# Patient Record
Sex: FEMALE | Race: WHITE
[De-identification: names, ages, dates, MRNs, and addresses within clinical notes are randomized per-mention and may not be internally consistent; named-entity substitution may affect disease eponyms.]

---

## 2019-10-04 ENCOUNTER — HOSPITAL ENCOUNTER (EMERGENCY)
Dept: HOSPITAL 11 - JP.ED | Age: 29
Discharge: HOME | End: 2019-10-04
Payer: SELF-PAY

## 2019-10-04 DIAGNOSIS — Z79.899: ICD-10-CM

## 2019-10-04 DIAGNOSIS — F41.9: ICD-10-CM

## 2019-10-04 DIAGNOSIS — Z87.891: ICD-10-CM

## 2019-10-04 DIAGNOSIS — R04.2: Primary | ICD-10-CM

## 2019-10-04 PROCEDURE — 85610 PROTHROMBIN TIME: CPT

## 2019-10-04 PROCEDURE — 96360 HYDRATION IV INFUSION INIT: CPT

## 2019-10-04 PROCEDURE — 85379 FIBRIN DEGRADATION QUANT: CPT

## 2019-10-04 PROCEDURE — 71046 X-RAY EXAM CHEST 2 VIEWS: CPT

## 2019-10-04 PROCEDURE — 99284 EMERGENCY DEPT VISIT MOD MDM: CPT

## 2019-10-04 PROCEDURE — 36415 COLL VENOUS BLD VENIPUNCTURE: CPT

## 2019-10-04 PROCEDURE — 85025 COMPLETE CBC W/AUTO DIFF WBC: CPT

## 2019-10-04 PROCEDURE — 71260 CT THORAX DX C+: CPT

## 2019-10-04 PROCEDURE — 80053 COMPREHEN METABOLIC PANEL: CPT

## 2019-10-04 NOTE — CRLCT
HISTORY:



Hemoptysis.



TECHNIQUE:



Intravenous contrast enhanced CT of the chest. 100 mL of Isovue-300 

intravenous contrast administered.



COMPARISON:



Chest radiographs 10/04/2019.



FINDINGS:



There is no acute pulmonary embolism. No thoracic aortic aneurysm or 

dissection. No significant pericardial effusion. No enlarged mediastinal or 

hilar lymph nodes. No axillary adenopathy.



-



There is no lung infiltrate or pulmonary edema. No pleural effusion or 

pneumothorax. No endotracheal or proximal endobronchial mass.



-



No acute fractures.



-



2.3 cm lesion within the lateral segment left hepatic lobe demonstrates 

some peripheral discontinuous nodular enhancement and therefore likely 

reflects a hemangioma.



IMPRESSION:



1. No lung infiltrate or pulmonary edema.



2. No pulmonary embolism.



3. 2.3 cm left hepatic lobe lesion is likely a hemangioma.



Dictated by Cy Raymundo MD @ 10/4/2019 12:51:05 PM



Please note that all CT scans at this facility use dose modulation, 

iterative reconstruction, and/or weight-based dosing when appropriate to 

reduce radiation dose to as low as reasonably achievable.



Dictated by: Cy Raymundo MD @ 10/04/2019 12:51:12



(Electronically Signed)

## 2019-10-04 NOTE — EDM.PDOC
ED HPI GENERAL MEDICAL PROBLEM





- General


Chief Complaint: Respiratory Problem


Stated Complaint: BAD COUGH


Time Seen by Provider: 10/04/19 10:23


Source of Information: Reports: Patient, RN Notes Reviewed


History Limitations: Reports: No Limitations





- History of Present Illness


INITIAL COMMENTS - FREE TEXT/NARRATIVE: 





29-year-old female presents emergency department today with complaint of cough, 

she states she has coughed up blood yesterday she states she was coughing delta 

blood today it's more blood tinged sputum. She denies any fevers weight loss 

nausea vomiting does have a remote tobacco history approximate 10 years ago 

does have secondhand exposure


  ** Chest


Pain Score (Numeric/FACES): 7





- Related Data


 Allergies











Allergy/AdvReac Type Severity Reaction Status Date / Time


 


No Known Allergies Allergy   Verified 10/04/19 10:09











Home Meds: 


 Home Meds





Sertraline HCl 75 mg PO DAILY 10/04/19 [History]











Past Medical History


Respiratory History: Reports: Asthma


OB/GYN History: Reports: Pregnancy


Neurological History: Reports: Migraines


Psychiatric History: Reports: Anxiety, Dementia, Suicidal Ideation





- Past Surgical History


Female  Surgical History: Reports: Tubal Ligation





Social & Family History





- Tobacco Use


Smoking Status *Q: Former Smoker


Used Tobacco, but Quit: Yes


Month/Year Tobacco Last Used: 10 years





- Caffeine Use


Caffeine Use: Reports: Coffee





- Recreational Drug Use


Recreational Drug Use: No





ED ROS GENERAL





- Review of Systems


Review Of Systems: See Below


Constitutional: Reports: No Symptoms


HEENT: Reports: No Symptoms


Respiratory: Reports: Cough, Hemoptysis


Cardiovascular: Reports: No Symptoms


GI/Abdominal: Reports: No Symptoms


: Reports: No Symptoms





ED EXAM, GENERAL





- Physical Exam


Exam: See Below


Exam Limited By: No Limitations


General Appearance: Alert, WD/WN, No Apparent Distress


Eye Exam: Bilateral Eye: Normal Inspection


Ears: Normal External Exam, Normal Canal, Hearing Grossly Normal, Normal TMs


Nose: Normal Inspection, Normal Mucosa, No Blood


Throat/Mouth: Normal Inspection, Normal Lips, Normal Teeth, Normal Gums, Normal 

Oropharynx, Normal Voice, No Airway Compromise


Head: Atraumatic, Normocephalic


Neck: Normal Inspection, Supple, Non-Tender, Full Range of Motion


Respiratory/Chest: No Respiratory Distress, Lungs Clear, Normal Breath Sounds, 

No Accessory Muscle Use, Chest Non-Tender


Cardiovascular: Regular Rate, Rhythm, No Murmur





Course





- Vital Signs


Last Recorded V/S: 


 Last Vital Signs











Temp  97.5 F   10/04/19 10:05


 


Pulse  63   10/04/19 10:05


 


Resp  16   10/04/19 10:05


 


BP  111/42 L  10/04/19 10:05


 


Pulse Ox  99   10/04/19 10:05














- Orders/Labs/Meds


Orders: 


 Active Orders 24 hr











 Category Date Time Status


 


 Peripheral IV Care [RC] .AS DIRECTED Care  10/04/19 11:50 Active


 


 CULTURE RESPIRATORY + SMEAR [RM] Urgent Lab  10/04/19 10:31 Ordered


 


 Iopamidol [Isovue-300 (61%)] Med  10/04/19 12:15 Active





 100 ml IV .AS DIRECTED PRN   


 


 Sodium Chloride 0.9% [Normal Saline] 1,000 ml Med  10/04/19 12:00 Active





 IV ASDIRECTED   


 


 Sodium Chloride 0.9% [Normal Saline] 70 ml Med  10/04/19 12:15 Active





 IV ASDIRECTED   


 


 Sodium Chloride 0.9% [Saline Flush] Med  10/04/19 11:50 Active





 10 ml FLUSH ASDIRECTED PRN   


 


 Peripheral IV Insertion Adult [OM.PC] Urgent Oth  10/04/19 11:50 Ordered








 Medication Orders





Sodium Chloride (Normal Saline)  1,000 mls @ 500 mls/hr IV ASDIRECTED [facility]


   Last Admin: 10/04/19 12:46  Dose: 500 mls/hr


Sodium Chloride (Normal Saline)  70 mls @ 3 mls/sec IV ASDIRECTED [facility]


   Last Admin: 10/04/19 12:21  Dose: 3 mls/sec


Iopamidol (Isovue-300 (61%))  100 ml IV .AS DIRECTED PRN


   PRN Reason: RADIOLOGY EXAM


   Stop: 10/05/19 12:16


   Last Admin: 10/04/19 12:21  Dose: 100 ml


Sodium Chloride (Saline Flush)  10 ml FLUSH ASDIRECTED PRN


   PRN Reason: Keep Vein Open


   Last Admin: 10/04/19 12:10  Dose: 10 ml








Labs: 


 Laboratory Tests











  10/04/19 10/04/19 10/04/19 Range/Units





  10:41 10:41 10:41 


 


WBC  5.5    (4.5-11.0)  K/uL


 


RBC  4.56    (3.30-5.50)  M/uL


 


Hgb  12.4    (12.0-15.0)  g/dL


 


Hct  39.6    (36.0-48.0)  %


 


MCV  87    (80-98)  fL


 


MCH  27    (27-31)  pg


 


MCHC  31 L    (32-36)  %


 


Plt Count  319    (150-400)  K/uL


 


Neut % (Auto)  55    (36-66)  %


 


Lymph % (Auto)  31    (24-44)  %


 


Mono % (Auto)  12 H    (2-6)  %


 


Eos % (Auto)  2    (2-4)  %


 


Baso % (Auto)  1    (0-1)  %


 


PT   10.9   (9.5-12.0)  sec


 


INR   1.01   (0.80-1.20)  


 


D-Dimer, Quantitative    107  (0.0-400.0)  ng/mL


 


Sodium     (140-148)  mmol/L


 


Potassium     (3.6-5.2)  mmol/L


 


Chloride     (100-108)  mmol/L


 


Carbon Dioxide     (21-32)  mmol/L


 


Anion Gap     (5.0-14.0)  mmol/L


 


BUN     (7-18)  mg/dL


 


Creatinine     (0.6-1.0)  mg/dL


 


Est Cr Clr Drug Dosing     mL/min


 


Estimated GFR (MDRD)     (>60)  


 


Glucose     ()  mg/dL


 


Calcium     (8.5-10.1)  mg/dL


 


Total Bilirubin     (0.2-1.0)  mg/dL


 


AST     (15-37)  U/L


 


ALT     (12-78)  U/L


 


Alkaline Phosphatase     ()  U/L


 


Total Protein     (6.4-8.2)  g/dL


 


Albumin     (3.4-5.0)  g/dL


 


Globulin     (2.3-3.5)  g/dL


 


Albumin/Globulin Ratio     (1.2-2.2)  














  10/04/19 Range/Units





  10:41 


 


WBC   (4.5-11.0)  K/uL


 


RBC   (3.30-5.50)  M/uL


 


Hgb   (12.0-15.0)  g/dL


 


Hct   (36.0-48.0)  %


 


MCV   (80-98)  fL


 


MCH   (27-31)  pg


 


MCHC   (32-36)  %


 


Plt Count   (150-400)  K/uL


 


Neut % (Auto)   (36-66)  %


 


Lymph % (Auto)   (24-44)  %


 


Mono % (Auto)   (2-6)  %


 


Eos % (Auto)   (2-4)  %


 


Baso % (Auto)   (0-1)  %


 


PT   (9.5-12.0)  sec


 


INR   (0.80-1.20)  


 


D-Dimer, Quantitative   (0.0-400.0)  ng/mL


 


Sodium  141  (140-148)  mmol/L


 


Potassium  4.1  (3.6-5.2)  mmol/L


 


Chloride  105  (100-108)  mmol/L


 


Carbon Dioxide  25  (21-32)  mmol/L


 


Anion Gap  10.9  (5.0-14.0)  mmol/L


 


BUN  12  (7-18)  mg/dL


 


Creatinine  0.9  (0.6-1.0)  mg/dL


 


Est Cr Clr Drug Dosing  76.30  mL/min


 


Estimated GFR (MDRD)  > 60  (>60)  


 


Glucose  90  ()  mg/dL


 


Calcium  9.0  (8.5-10.1)  mg/dL


 


Total Bilirubin  0.3  (0.2-1.0)  mg/dL


 


AST  13 L  (15-37)  U/L


 


ALT  15  (12-78)  U/L


 


Alkaline Phosphatase  39 L  ()  U/L


 


Total Protein  7.5  (6.4-8.2)  g/dL


 


Albumin  3.7  (3.4-5.0)  g/dL


 


Globulin  3.8 H  (2.3-3.5)  g/dL


 


Albumin/Globulin Ratio  1.0 L  (1.2-2.2)  











Meds: 


Medications











Generic Name Dose Route Start Last Admin





  Trade Name Freq  PRN Reason Stop Dose Admin


 


Sodium Chloride  1,000 mls @ 500 mls/hr  10/04/19 12:00  10/04/19 12:46





  Normal Saline  IV   500 mls/hr





  ASDIRECTED DENIS   Administration





     





     





     





     


 


Sodium Chloride  70 mls @ 3 mls/sec  10/04/19 12:15  10/04/19 12:21





  Normal Saline  IV   3 mls/sec





  ASDIRECTED DENIS   Administration





     





     





     





     


 


Iopamidol  100 ml  10/04/19 12:15  10/04/19 12:21





  Isovue-300 (61%)  IV  10/05/19 12:16  100 ml





  .AS DIRECTED PRN   Administration





  RADIOLOGY EXAM   





     





     





     


 


Sodium Chloride  10 ml  10/04/19 11:50  10/04/19 12:10





  Saline Flush  FLUSH   10 ml





  ASDIRECTED PRN   Administration





  Keep Vein Open   





     





     





     














Departure





- Departure


Time of Disposition: 13:13


Disposition: Home, Self-Care 01


Condition: Fair


Clinical Impression: 


 Hemoptysis








- Discharge Information


Referrals: 


PCP,None [Primary Care Provider] - 


Forms:  ED Department Discharge


Additional Instructions: 


Please report to the outpatient surgical care unit Monday morning for 

bronchoscopy with Dr. Marshall





- My Orders


Last 24 Hours: 


My Active Orders





10/04/19 10:31


CULTURE RESPIRATORY + SMEAR [RM] Urgent 





10/04/19 11:50


Peripheral IV Care [RC] .AS DIRECTED 


Sodium Chloride 0.9% [Saline Flush]   10 ml FLUSH ASDIRECTED PRN 


Peripheral IV Insertion Adult [OM.PC] Urgent 





10/04/19 12:00


Sodium Chloride 0.9% [Normal Saline] 1,000 ml IV ASDIRECTED 





10/04/19 12:15


Iopamidol [Isovue-300 (61%)]   100 ml IV .AS DIRECTED PRN 


Sodium Chloride 0.9% [Normal Saline] 70 ml IV ASDIRECTED 














- Assessment/Plan


Last 24 Hours: 


My Active Orders





10/04/19 10:31


CULTURE RESPIRATORY + SMEAR [RM] Urgent 





10/04/19 11:50


Peripheral IV Care [RC] .AS DIRECTED 


Sodium Chloride 0.9% [Saline Flush]   10 ml FLUSH ASDIRECTED PRN 


Peripheral IV Insertion Adult [OM.PC] Urgent 





10/04/19 12:00


Sodium Chloride 0.9% [Normal Saline] 1,000 ml IV ASDIRECTED 





10/04/19 12:15


Iopamidol [Isovue-300 (61%)]   100 ml IV .AS DIRECTED PRN 


Sodium Chloride 0.9% [Normal Saline] 70 ml IV ASDIRECTED 











Plan: 





Assessment





Acuity = acute





Site and laterality = hemoptysis  





Etiology  = unknown etiology  





Manifestations = none  





Location of injury =  Home





Lab values = CBC, CMP, d-dimer all within normal limits, chest x-ray 

unremarkable CT scan with contrast shows no acute process  





Plan


Called discussed case with Dr. Marshall general surgery at 1300 he agreed to 

evaluate the patient with bronchoscopy this be planned for Monday morning  

















 This note was dictated using dragon voice recognition software please call 

with any questions on syntax or grammar.

## 2019-10-04 NOTE — CRLCR
INDICATION:



Hemoptysis.



TECHNIQUE:



PA and lateral.



COMPARISON:



None.



FINDINGS:



Lungs and pleural spaces clear. Heart size and pulmonary vasculature within 

normal limits. No significant osseous abnormality.



IMPRESSION:



Negative chest.



Dictated by Buster Almonte MD @ Oct  4 2019 11:07AM



Signed by Dr. Buster Almonte @ Oct  4 2019 11:08AM

## 2019-10-07 ENCOUNTER — HOSPITAL ENCOUNTER (OUTPATIENT)
Dept: HOSPITAL 11 - JP.SDS | Age: 29
Discharge: HOME | End: 2019-10-07
Attending: SURGERY
Payer: SELF-PAY

## 2019-10-07 DIAGNOSIS — J40: ICD-10-CM

## 2019-10-07 DIAGNOSIS — R04.2: Primary | ICD-10-CM

## 2019-10-07 PROCEDURE — 87070 CULTURE OTHR SPECIMN AEROBIC: CPT

## 2019-10-07 PROCEDURE — 31624 DX BRONCHOSCOPE/LAVAGE: CPT

## 2019-10-07 PROCEDURE — 87205 SMEAR GRAM STAIN: CPT

## 2019-10-07 PROCEDURE — 87116 MYCOBACTERIA CULTURE: CPT

## 2019-10-07 PROCEDURE — 87102 FUNGUS ISOLATION CULTURE: CPT

## 2019-10-07 PROCEDURE — 87220 TISSUE EXAM FOR FUNGI: CPT

## 2019-10-07 PROCEDURE — 87184 SC STD DISK METHOD PER PLATE: CPT

## 2019-10-07 PROCEDURE — 87015 SPECIMEN INFECT AGNT CONCNTJ: CPT

## 2019-10-07 PROCEDURE — 88112 CYTOPATH CELL ENHANCE TECH: CPT

## 2019-10-07 PROCEDURE — 87206 SMEAR FLUORESCENT/ACID STAI: CPT

## 2019-10-08 NOTE — OR
DATE OF PROCEDURE:  10/07/2019

 

SURGEON:  Arnaldo Marshall MD

 

PREOPERATIVE DIAGNOSIS:  History of hemoptysis.

 

POSTOPERATIVE DIAGNOSIS:  History of hemoptysis with mild bronchitis involving the right

upper lobe.

 

OPERATIVE PROCEDURE:  Flexible bronchoscopy with:

1. Tracheobronchial washings.

2. Bronchoalveolar lavage, right upper lobe.

 

ANESTHESIA:  Topical plus IV sedation.

 

INDICATION FOR PROCEDURE:  The patient was seen in the emergency room this past Friday with

some hemoptysis after having some heavy coughing.  However, over the weekend, she has

coughed up some more brown-type material consistent with some old blood, but no fresh blood,

and the cough itself has largely dissipated.  The patient did have a CT scan, which showed

no obvious pulmonary or tracheal pathology.  She did have what appeared to be hemangioma of

the liver, but that was felt to be a hemangioma that would not necessarily need any

additional followup or treatment.  Plan is to proceed with flexible bronchoscopy with

biopsies and/or lavage as indicated.  Potential risks including bleeding and infection were

reviewed, and the patient wishes to proceed.

 

DETAILS OF PROCEDURE:  The patient was taken to the operating room and placed in a semi-

sitting position.  IV sedation was administered, after which the transpharyngeal injection

of lidocaine was placed for anesthesia.  Flexible bronchoscope was then passed through the

left side of the nose.  The patient did have a little bit of nasal bleeding at that passage,

but this stopped spontaneously.

 

The hypopharynx and larynx were unremarkable.  Cord motion was symmetrical.  As one passed

into the trachea, there was no visible pathology.  Tracheal and carinal course were

unremarkable.  The tracheobronchial tree was diffusely evaluated.  There were no abnormal

secretions present, nothing more than just simple typical watery scant secretions.  There

was some very slight redness in the takeoff of the right upper lobe bronchus, but the

remainder of the tracheobronchial tree was unremarkable.

 

To this level, tracheobronchial washings were obtained.  Following this, the bronchoscope

was wedged into the right upper lobe bronchus and 200 mL of saline injected.  This affluent

was then removed in 2 containers, both of which along with tracheobronchial washings were

sent for full microbiologic and cytologic workup.  The procedure was then concluded.  The

microbiologic workup would include routine bacterial cultures, fungal stains and cultures,

and acid-fast stains and cultures, and the fluid also being sent for cytologic examination.

The patient was taken to the recovery room in satisfactory condition.

 

The patient will be following up with her personal provider, which is Janice Ross PA-C,

in [facility], and followup here will be on a p.r.n. basis.

 

 

 

 

Arnaldo Marshall MD

DD:  10/07/2019 16:45:48

DT:  10/07/2019 19:42:20

Job #:  [id number]

## 2019-10-10 ENCOUNTER — HOSPITAL ENCOUNTER (EMERGENCY)
Dept: HOSPITAL 11 - JP.ED | Age: 29
Discharge: HOME | End: 2019-10-10
Payer: SELF-PAY

## 2019-10-10 DIAGNOSIS — Z79.899: ICD-10-CM

## 2019-10-10 DIAGNOSIS — Z87.891: ICD-10-CM

## 2019-10-10 DIAGNOSIS — F41.9: ICD-10-CM

## 2019-10-10 DIAGNOSIS — F03.90: ICD-10-CM

## 2019-10-10 DIAGNOSIS — J13: Primary | ICD-10-CM

## 2019-10-10 DIAGNOSIS — R04.2: ICD-10-CM

## 2019-10-10 NOTE — CRLCR
INDICATION: coughing up blood



TECHNIQUE:



Chest 1 view. 



COMPARISON:



10/4/19



FINDINGS:



Cardiovascular and mediastinum: Heart size and vasculature are normal in 

caliber and appearance.  Mediastinum is within normal limits.  



Lungs and pleural space: Lungs are clear.  No sign of infiltrate or mass.  

No sign of pleural effusion.  No pneumothorax.  



Bones and soft tissues: No significant findings.  



IMPRESSION:



Unremarkable chest.



Dictated by: Nish Schaffer MD @ 10/10/2019 15:01:19



(Electronically Signed)

## 2019-10-10 NOTE — EDM.PDOC
ED HPI GENERAL MEDICAL PROBLEM





- General


Chief Complaint: Respiratory Problem


Stated Complaint: COUGH,DIZZY,PAIN


Time Seen by Provider: 10/10/19 13:40


Source of Information: Reports: Patient


History Limitations: Reports: No Limitations





- History of Present Illness


INITIAL COMMENTS - FREE TEXT/NARRATIVE: 





pt arrived with a history of coughing up alot of blood. She had a bronchoscopy 

done on Monday. Since that time she has had pain in her chest and back. She 

does continue to cough up blood.  She states the  sputum that she raises  is 

very bloody. 


Onset: Today


Duration: Hour(s):


Location: Reports: Chest, Back


Associated Symptoms: Reports: Chest Pain, Other (pain in her back. She 

continues to cough up blood. )


  ** Chest


Pain Score (Numeric/FACES): 10





- Related Data


 Allergies











Allergy/AdvReac Type Severity Reaction Status Date / Time


 


No Known Allergies Allergy   Verified 10/10/19 14:54











Home Meds: 


 Home Meds





Sertraline HCl 75 mg PO DAILY 10/04/19 [History]











Past Medical History


HEENT History: Reports: Impaired Vision


Respiratory History: Reports: Asthma


Gastrointestinal History: Reports: GERD


Genitourinary History: Reports: UTI, Recurrent


OB/GYN History: Reports: Pregnancy, Spontaneous 


Musculoskeletal History: Reports: Back Pain, Chronic, Fracture


Neurological History: Reports: Concussion, Migraines


Psychiatric History: Reports: Anxiety, Dementia, PTSD, Suicidal Ideation


Hematologic History: Reports: Anemia





- Infectious Disease History


Infectious Disease History: Reports: Chicken Pox, Influenza





- Past Surgical History


HEENT Surgical History: Reports: Oral Surgery


Respiratory Surgical History: Reports: None


GI Surgical History: Reports: None


Female  Surgical History: Reports: Tubal Ligation


Neurological Surgical History: Reports: None





Social & Family History





- Family History


Family Medical History: Noncontributory





- Tobacco Use


Smoking Status *Q: Former Smoker


Used Tobacco, but Quit: Yes


Month/Year Tobacco Last Used: 10 years





- Caffeine Use


Caffeine Use: Reports: Coffee





- Recreational Drug Use


Recreational Drug Use: No





ED ROS GENERAL





- Review of Systems


Review Of Systems: See Below


Constitutional: Reports: Weakness


HEENT: Reports: No Symptoms


Respiratory: Reports: Cough, Hemoptysis


Cardiovascular: Reports: No Symptoms


Endocrine: Reports: No Symptoms


GI/Abdominal: Reports: No Symptoms


: Reports: No Symptoms


Musculoskeletal: Reports: No Symptoms


Skin: Reports: No Symptoms





ED EXAM, GENERAL





- Physical Exam


Exam: See Below


Free Text/Narrative:: 





pt continues to cough up blood. She is feeling weak and shakey. Her culture 

from the bronch did grow out strept pneumonae which was sensitive to levoquin. 

She presently is not on antibiotics. 


Exam Limited By: No Limitations


General Appearance: Alert, Anxious, Mild Distress


Ears: Normal TMs


Nose: Normal Inspection


Throat/Mouth: Normal Inspection


Head: Atraumatic


Neck: Normal Inspection


Respiratory/Chest: No Respiratory Distress, Other (pt did have a bronch which 

grew out strept pneumonae. )


Cardiovascular: Regular Rate, Rhythm


GI/Abdominal: Soft, Non-Tender


 (Female) Exam: Deferred


Rectal (Female) Exam: Deferred


Back Exam: Normal Inspection


Extremities: Normal Inspection


Neurological: Alert, Oriented, Normal Cognition





Course





- Vital Signs


Last Recorded V/S: 


 Last Vital Signs











Temp  36.6 C   10/10/19 13:40


 


Pulse  65   10/10/19 15:20


 


Resp  16   10/10/19 15:20


 


BP  97/63   10/10/19 15:20


 


Pulse Ox  100   10/10/19 15:20














- Orders/Labs/Meds


Labs: 


 Laboratory Tests











  10/10/19 10/10/19 10/10/19 Range/Units





  14:34 14:34 14:34 


 


WBC   6.2   (4.5-11.0)  K/uL


 


RBC   4.35   (3.30-5.50)  M/uL


 


Hgb   11.8 L   (12.0-15.0)  g/dL


 


Hct   38.0   (36.0-48.0)  %


 


MCV   87   (80-98)  fL


 


MCH   27   (27-31)  pg


 


MCHC   31 L   (32-36)  %


 


Plt Count   279   (150-400)  K/uL


 


Neut % (Auto)   60   (36-66)  %


 


Lymph % (Auto)   24   (24-44)  %


 


Mono % (Auto)   13 H   (2-6)  %


 


Eos % (Auto)   2   (2-4)  %


 


Baso % (Auto)   1   (0-1)  %


 


APTT  28.7    (27.0-36.0)  sec


 


Sodium    139 L  (140-148)  mmol/L


 


Potassium    4.1  (3.6-5.2)  mmol/L


 


Chloride    105  (100-108)  mmol/L


 


Carbon Dioxide    26  (21-32)  mmol/L


 


Anion Gap    12.1  (5.0-14.0)  mmol/L


 


BUN    16  (7-18)  mg/dL


 


Creatinine    0.7  (0.6-1.0)  mg/dL


 


Est Cr Clr Drug Dosing    99.97  mL/min


 


Estimated GFR (MDRD)    > 60  (>60)  


 


Glucose    82  ()  mg/dL


 


Calcium    8.8  (8.5-10.1)  mg/dL


 


Total Bilirubin    0.2  (0.2-1.0)  mg/dL


 


AST    11 L  (15-37)  U/L


 


ALT    14  (12-78)  U/L


 


Alkaline Phosphatase    37 L  ()  U/L


 


Total Protein    7.3  (6.4-8.2)  g/dL


 


Albumin    3.4  (3.4-5.0)  g/dL


 


Globulin    3.9 H  (2.3-3.5)  g/dL


 


Albumin/Globulin Ratio    0.9 L  (1.2-2.2)  














- Re-Assessments/Exams


Free Text/Narrative Re-Assessment/Exam: 





10/14/19 07:24


pt did grow out strept pneumonae and was placed on levoquin today. A appt will 

be set up with pulmonology in [facility]. She will be given a call regardng the 

time. If the bleeding gets worse she will call. I did advise her that the 

antibiotics may make the bleeding better if the infection is controlled. 





Departure





- Departure


Time of Disposition: 15:33


Disposition: Home, Self-Care 01


Condition: Fair


Clinical Impression: 


 Hemoptysis, Streptococcus pneumoniae








- Discharge Information


Instructions:  Hemoptysis, Easy-to-Read


Referrals: 


PCP,None [Primary Care Provider] - 


Forms:  ED Department Discharge


Care Plan Goals: 


referal to pulmonoly in [place], levoquin 500mg daily for 10 days.